# Patient Record
Sex: MALE | Race: WHITE | NOT HISPANIC OR LATINO | Employment: FULL TIME | URBAN - NONMETROPOLITAN AREA
[De-identification: names, ages, dates, MRNs, and addresses within clinical notes are randomized per-mention and may not be internally consistent; named-entity substitution may affect disease eponyms.]

---

## 2018-12-05 ENCOUNTER — HOSPITAL ENCOUNTER (EMERGENCY)
Facility: HOSPITAL | Age: 23
Discharge: HOME | End: 2018-12-05
Attending: EMERGENCY MEDICINE | Admitting: EMERGENCY MEDICINE

## 2018-12-05 ENCOUNTER — APPOINTMENT (OUTPATIENT)
Dept: GENERAL RADIOLOGY | Facility: HOSPITAL | Age: 23
End: 2018-12-05

## 2018-12-05 VITALS
BODY MASS INDEX: 23.86 KG/M2 | HEIGHT: 73 IN | OXYGEN SATURATION: 99 % | WEIGHT: 180 LBS | DIASTOLIC BLOOD PRESSURE: 50 MMHG | RESPIRATION RATE: 16 BRPM | SYSTOLIC BLOOD PRESSURE: 128 MMHG | HEART RATE: 75 BPM | TEMPERATURE: 98.3 F

## 2018-12-05 DIAGNOSIS — S97.82XA CRUSHING INJURY OF LEFT FOOT, INITIAL ENCOUNTER: Primary | ICD-10-CM

## 2018-12-05 DIAGNOSIS — S93.402A MODERATE LEFT ANKLE SPRAIN, INITIAL ENCOUNTER: ICD-10-CM

## 2018-12-05 PROCEDURE — 73610 X-RAY EXAM OF ANKLE: CPT

## 2018-12-05 PROCEDURE — 73620 X-RAY EXAM OF FOOT: CPT | Performed by: RADIOLOGY

## 2018-12-05 PROCEDURE — 73620 X-RAY EXAM OF FOOT: CPT

## 2018-12-05 PROCEDURE — 99283 EMERGENCY DEPT VISIT LOW MDM: CPT

## 2018-12-05 PROCEDURE — 73610 X-RAY EXAM OF ANKLE: CPT | Performed by: RADIOLOGY

## 2018-12-05 RX ORDER — INDOMETHACIN 25 MG/1
25 CAPSULE ORAL 3 TIMES DAILY PRN
Qty: 15 CAPSULE | Refills: 0 | OUTPATIENT
Start: 2018-12-05 | End: 2019-03-12

## 2018-12-05 NOTE — ED PROVIDER NOTES
"Subjective   This is a 24 y/o male that comes \"left foot/ankle injury\" X 2 hours. Patient states that that he dropped a trailer on left foot on left foot. Has had increased pain and swelling.  States he has not been able to bear weight on it.        History provided by:  Patient   used: No    Lower Extremity Issue   Location:  Leg and ankle  Time since incident:  1 day  Injury: yes    Mechanism of injury comment:  Direct blow  Leg location:  L leg  Ankle location:  L ankle  Pain details:     Quality:  Aching, shooting and throbbing    Radiates to:  Does not radiate    Severity:  Moderate    Onset quality:  Sudden    Duration:  2 days    Timing:  Intermittent    Progression:  Worsening  Chronicity:  New  Dislocation: no    Foreign body present:  No foreign bodies  Tetanus status:  Unknown  Prior injury to area:  No  Relieved by:  Nothing  Worsened by:  Nothing  Ineffective treatments:  None tried  Associated symptoms: decreased ROM, stiffness and swelling    Associated symptoms: no back pain    Risk factors: no concern for non-accidental trauma, no frequent fractures, no obesity and no recent illness        Review of Systems   Musculoskeletal: Positive for arthralgias, joint swelling, myalgias and stiffness. Negative for back pain.   All other systems reviewed and are negative.      No past medical history on file.    No Known Allergies    No past surgical history on file.    No family history on file.    Social History     Socioeconomic History   • Marital status: Single     Spouse name: Not on file   • Number of children: Not on file   • Years of education: Not on file   • Highest education level: Not on file           Objective   Physical Exam   Constitutional: He is oriented to person, place, and time. He appears well-developed and well-nourished. No distress.   HENT:   Head: Normocephalic.   Right Ear: External ear normal.   Left Ear: External ear normal.   Nose: Nose normal.   Mouth/Throat: " Oropharynx is clear and moist. No oropharyngeal exudate.   Eyes: Conjunctivae are normal. Pupils are equal, round, and reactive to light. Right eye exhibits no discharge. Left eye exhibits no discharge.   Neck: Normal range of motion. Neck supple. No JVD present. No tracheal deviation present. No thyromegaly present.   Cardiovascular: Normal rate, regular rhythm, normal heart sounds and intact distal pulses. Exam reveals no friction rub.   No murmur heard.  Pulmonary/Chest: Effort normal and breath sounds normal. No stridor. No respiratory distress. He has no wheezes. He has no rales.   Abdominal: Soft. Bowel sounds are normal. He exhibits no distension and no mass. There is no tenderness. There is no rebound and no guarding.   Musculoskeletal: He exhibits edema and tenderness.        Left ankle: He exhibits decreased range of motion and swelling. Tenderness. Lateral malleolus tenderness found.   Lymphadenopathy:     He has no cervical adenopathy.   Neurological: He is alert and oriented to person, place, and time. He displays normal reflexes. No cranial nerve deficit or sensory deficit. He exhibits normal muscle tone. Coordination normal.   Skin: Skin is warm and dry. Capillary refill takes less than 2 seconds. No rash noted. He is not diaphoretic. No erythema. No pallor.   Psychiatric: He has a normal mood and affect. His behavior is normal. Judgment and thought content normal.   Nursing note and vitals reviewed.      Splint - Cast - Strapping  Date/Time: 12/5/2018 4:14 PM  Performed by: Walker Kearns PA-C  Authorized by: Joe Salas MD     Consent:     Consent obtained:  Verbal    Consent given by:  Patient    Risks discussed:  Discoloration  Pre-procedure details:     Sensation:  Normal    Skin color:  Pink   Procedure details:     Laterality:  Left    Location:  Foot    Strapping: no      Cast type:  Short leg    Splint type:  Short leg    Supplies:  Prefabricated splint  Post-procedure  details:     Pain:  Improved    Sensation:  Normal    Skin color:  Pink     Patient tolerance of procedure:  Tolerated well, no immediate complications  Comments:      N/V intact                ED Course  ED Course as of Dec 05 1617   Wed Dec 05, 2018   1555 No acute bony abnormality.   []      ED Course User Index  [] Walker Kearns PA-C                  [facility]      Final diagnoses:   Crushing injury of left foot, initial encounter   Moderate left ankle sprain, initial encounter            Walker Kearns PA-C  12/05/18 1617       Walker Kearns PA-C  12/05/18 1617

## 2019-03-12 ENCOUNTER — APPOINTMENT (OUTPATIENT)
Dept: CT IMAGING | Facility: HOSPITAL | Age: 24
End: 2019-03-12

## 2019-03-12 ENCOUNTER — APPOINTMENT (OUTPATIENT)
Dept: GENERAL RADIOLOGY | Facility: HOSPITAL | Age: 24
End: 2019-03-12

## 2019-03-12 ENCOUNTER — HOSPITAL ENCOUNTER (EMERGENCY)
Facility: HOSPITAL | Age: 24
Discharge: HOME | End: 2019-03-12
Attending: EMERGENCY MEDICINE | Admitting: EMERGENCY MEDICINE

## 2019-03-12 VITALS
HEIGHT: 73 IN | BODY MASS INDEX: 23.86 KG/M2 | WEIGHT: 180 LBS | HEART RATE: 61 BPM | RESPIRATION RATE: 18 BRPM | OXYGEN SATURATION: 100 % | SYSTOLIC BLOOD PRESSURE: 111 MMHG | DIASTOLIC BLOOD PRESSURE: 73 MMHG | TEMPERATURE: 98 F

## 2019-03-12 DIAGNOSIS — S76.012A HIP STRAIN, LEFT, INITIAL ENCOUNTER: ICD-10-CM
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
## 2019-03-12 DIAGNOSIS — S39.012A STRAIN OF LUMBAR REGION, INITIAL ENCOUNTER: ICD-10-CM

## 2019-03-12 LAB
ALBUMIN SERPL-MCNC: 4.86 G/DL (ref 3.5–5.2)
ALBUMIN/GLOB SERPL: 2 G/DL
ALP SERPL-CCNC: 79 U/L (ref 39–117)
ALT SERPL W P-5'-P-CCNC: 19 U/L (ref 1–41)
AMYLASE SERPL-CCNC: 54 U/L (ref 28–100)
ANION GAP SERPL CALCULATED.3IONS-SCNC: 9.6 MMOL/L
AST SERPL-CCNC: 20 U/L (ref 1–40)
BASOPHILS # BLD AUTO: 0.02 10*3/MM3 (ref 0–0.2)
BASOPHILS NFR BLD AUTO: 0.3 % (ref 0–1.5)
BILIRUB SERPL-MCNC: 0.6 MG/DL (ref 0.1–1.2)
BILIRUB UR QL STRIP: NEGATIVE
BUN BLD-MCNC: 10 MG/DL (ref 6–20)
BUN/CREAT SERPL: 13.5 (ref 7–25)
CALCIUM SPEC-SCNC: 9.6 MG/DL (ref 8.6–10.5)
CHLORIDE SERPL-SCNC: 104 MMOL/L (ref 98–107)
CLARITY UR: CLEAR
CO2 SERPL-SCNC: 26.4 MMOL/L (ref 22–29)
COLOR UR: YELLOW
CREAT BLD-MCNC: 0.74 MG/DL (ref 0.76–1.27)
DEPRECATED RDW RBC AUTO: 39.3 FL (ref 37–54)
EOSINOPHIL # BLD AUTO: 0.22 10*3/MM3 (ref 0–0.4)
EOSINOPHIL NFR BLD AUTO: 3.4 % (ref 0.3–6.2)
ERYTHROCYTE [DISTWIDTH] IN BLOOD BY AUTOMATED COUNT: 13.1 % (ref 12.3–15.4)
GFR SERPL CREATININE-BSD FRML MDRD: 131 ML/MIN/1.73
GLOBULIN UR ELPH-MCNC: 2.4 GM/DL
GLUCOSE BLD-MCNC: 88 MG/DL (ref 65–99)
GLUCOSE UR STRIP-MCNC: NEGATIVE MG/DL
HCT VFR BLD AUTO: 43.8 % (ref 37.5–51)
HGB BLD-MCNC: 14.8 G/DL (ref 13–17.7)
HGB UR QL STRIP.AUTO: NEGATIVE
IMM GRANULOCYTES # BLD AUTO: 0.01 10*3/MM3 (ref 0–0.05)
IMM GRANULOCYTES NFR BLD AUTO: 0.2 % (ref 0–0.5)
KETONES UR QL STRIP: NEGATIVE
LEUKOCYTE ESTERASE UR QL STRIP.AUTO: NEGATIVE
LIPASE SERPL-CCNC: 20 U/L (ref 13–60)
LYMPHOCYTES # BLD AUTO: 1.94 10*3/MM3 (ref 0.7–3.1)
LYMPHOCYTES NFR BLD AUTO: 29.8 % (ref 19.6–45.3)
MCH RBC QN AUTO: 28 PG (ref 26.6–33)
MCHC RBC AUTO-ENTMCNC: 33.8 G/DL (ref 31.5–35.7)
MCV RBC AUTO: 82.8 FL (ref 79–97)
MONOCYTES # BLD AUTO: 0.48 10*3/MM3 (ref 0.1–0.9)
MONOCYTES NFR BLD AUTO: 7.4 % (ref 5–12)
NEUTROPHILS # BLD AUTO: 3.84 10*3/MM3 (ref 1.4–7)
NEUTROPHILS NFR BLD AUTO: 58.9 % (ref 42.7–76)
NITRITE UR QL STRIP: NEGATIVE
PH UR STRIP.AUTO: 8.5 [PH] (ref 5–8)
PLATELET # BLD AUTO: 249 10*3/MM3 (ref 140–450)
PMV BLD AUTO: 9.6 FL (ref 6–12)
POTASSIUM BLD-SCNC: 4.4 MMOL/L (ref 3.5–5.2)
PROT SERPL-MCNC: 7.3 G/DL (ref 6–8.5)
PROT UR QL STRIP: NEGATIVE
RBC # BLD AUTO: 5.29 10*6/MM3 (ref 4.14–5.8)
SODIUM BLD-SCNC: 140 MMOL/L (ref 136–145)
SP GR UR STRIP: 1.02 (ref 1–1.03)
UROBILINOGEN UR QL STRIP: ABNORMAL
WBC NRBC COR # BLD: 6.51 10*3/MM3 (ref 3.4–10.8)

## 2019-03-12 PROCEDURE — 85025 COMPLETE CBC W/AUTO DIFF WBC: CPT | Performed by: PHYSICIAN ASSISTANT

## 2019-03-12 PROCEDURE — 96360 HYDRATION IV INFUSION INIT: CPT

## 2019-03-12 PROCEDURE — 81003 URINALYSIS AUTO W/O SCOPE: CPT | Performed by: PHYSICIAN ASSISTANT

## 2019-03-12 PROCEDURE — 25010000002 IOPAMIDOL 61 % SOLUTION: Performed by: EMERGENCY MEDICINE

## 2019-03-12 PROCEDURE — 99284 EMERGENCY DEPT VISIT MOD MDM: CPT

## 2019-03-12 PROCEDURE — 73502 X-RAY EXAM HIP UNI 2-3 VIEWS: CPT

## 2019-03-12 PROCEDURE — 83690 ASSAY OF LIPASE: CPT | Performed by: PHYSICIAN ASSISTANT

## 2019-03-12 PROCEDURE — 73502 X-RAY EXAM HIP UNI 2-3 VIEWS: CPT | Performed by: RADIOLOGY

## 2019-03-12 PROCEDURE — 72125 CT NECK SPINE W/O DYE: CPT | Performed by: RADIOLOGY

## 2019-03-12 PROCEDURE — 72131 CT LUMBAR SPINE W/O DYE: CPT

## 2019-03-12 PROCEDURE — 74177 CT ABD & PELVIS W/CONTRAST: CPT | Performed by: RADIOLOGY

## 2019-03-12 PROCEDURE — 70450 CT HEAD/BRAIN W/O DYE: CPT

## 2019-03-12 PROCEDURE — 96361 HYDRATE IV INFUSION ADD-ON: CPT

## 2019-03-12 PROCEDURE — 73610 X-RAY EXAM OF ANKLE: CPT

## 2019-03-12 PROCEDURE — 82150 ASSAY OF AMYLASE: CPT | Performed by: PHYSICIAN ASSISTANT

## 2019-03-12 PROCEDURE — 71045 X-RAY EXAM CHEST 1 VIEW: CPT | Performed by: RADIOLOGY

## 2019-03-12 PROCEDURE — 71045 X-RAY EXAM CHEST 1 VIEW: CPT

## 2019-03-12 PROCEDURE — 72125 CT NECK SPINE W/O DYE: CPT

## 2019-03-12 PROCEDURE — 72131 CT LUMBAR SPINE W/O DYE: CPT | Performed by: RADIOLOGY

## 2019-03-12 PROCEDURE — 70450 CT HEAD/BRAIN W/O DYE: CPT | Performed by: RADIOLOGY

## 2019-03-12 PROCEDURE — 73610 X-RAY EXAM OF ANKLE: CPT | Performed by: RADIOLOGY

## 2019-03-12 PROCEDURE — 80053 COMPREHEN METABOLIC PANEL: CPT | Performed by: PHYSICIAN ASSISTANT

## 2019-03-12 PROCEDURE — 74177 CT ABD & PELVIS W/CONTRAST: CPT

## 2019-03-12 RX ORDER — SODIUM CHLORIDE 0.9 % (FLUSH) 0.9 %
10 SYRINGE (ML) INJECTION AS NEEDED
Status: DISCONTINUED | OUTPATIENT
Start: 2019-03-12 | End: 2019-03-12 | Stop reason: HOSPADM

## 2019-03-12 RX ORDER — CYCLOBENZAPRINE HCL 10 MG
10 TABLET ORAL 3 TIMES DAILY PRN
Qty: 15 TABLET | Refills: 0 | Status: SHIPPED | OUTPATIENT
Start: 2019-03-12

## 2019-03-12 RX ORDER — SODIUM CHLORIDE 9 MG/ML
125 INJECTION, SOLUTION INTRAVENOUS CONTINUOUS
Status: DISCONTINUED | OUTPATIENT
Start: 2019-03-12 | End: 2019-03-12 | Stop reason: HOSPADM

## 2019-03-12 RX ORDER — NAPROXEN 500 MG/1
500 TABLET ORAL 2 TIMES DAILY PRN
Qty: 20 TABLET | Refills: 0 | Status: SHIPPED | OUTPATIENT
Start: 2019-03-12

## 2019-03-12 RX ADMIN — SODIUM CHLORIDE 125 ML/HR: 9 INJECTION, SOLUTION INTRAVENOUS at 11:50

## 2019-03-12 RX ADMIN — IOPAMIDOL 100 ML: 612 INJECTION, SOLUTION INTRAVENOUS at 14:14
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
## 2019-03-20 ENCOUNTER — TRANSCRIBE ORDERS (OUTPATIENT)
Dept: PHYSICAL THERAPY | Facility: HOSPITAL | Age: 24
End: 2019-03-20

## 2019-03-20 DIAGNOSIS — S39.012D STRAIN OF LUMBAR REGION, SUBSEQUENT ENCOUNTER: Primary | ICD-10-CM

## 2019-03-20 DIAGNOSIS — S86.912D STRAIN OF LEFT KNEE, SUBSEQUENT ENCOUNTER: ICD-10-CM

## 2019-03-21 ENCOUNTER — HOSPITAL ENCOUNTER (OUTPATIENT)
Dept: PHYSICAL THERAPY | Facility: HOSPITAL | Age: 24
Setting detail: THERAPIES SERIES
Discharge: HOME | End: 2019-03-21

## 2019-03-21 DIAGNOSIS — M25.551 BILATERAL HIP PAIN: ICD-10-CM

## 2019-03-21 DIAGNOSIS — M25.562 ACUTE PAIN OF LEFT KNEE: ICD-10-CM

## 2019-03-21 DIAGNOSIS — S39.012D STRAIN OF LUMBAR REGION, SUBSEQUENT ENCOUNTER: Primary | ICD-10-CM

## 2019-03-21 DIAGNOSIS — M25.552 BILATERAL HIP PAIN: ICD-10-CM

## 2019-03-21 PROCEDURE — G0283 ELEC STIM OTHER THAN WOUND: HCPCS | Performed by: PHYSICAL THERAPIST

## 2019-03-21 PROCEDURE — 97162 PT EVAL MOD COMPLEX 30 MIN: CPT | Performed by: PHYSICAL THERAPIST

## 2019-03-21 PROCEDURE — 97010 HOT OR COLD PACKS THERAPY: CPT | Performed by: PHYSICAL THERAPIST
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
## 2019-03-25 ENCOUNTER — HOSPITAL ENCOUNTER (OUTPATIENT)
Dept: PHYSICAL THERAPY | Facility: HOSPITAL | Age: 24
Setting detail: THERAPIES SERIES
Discharge: HOME | End: 2019-03-25

## 2019-03-25 DIAGNOSIS — S39.012D STRAIN OF LUMBAR REGION, SUBSEQUENT ENCOUNTER: Primary | ICD-10-CM

## 2019-03-25 DIAGNOSIS — M25.552 BILATERAL HIP PAIN: ICD-10-CM

## 2019-03-25 DIAGNOSIS — M25.551 BILATERAL HIP PAIN: ICD-10-CM

## 2019-03-25 DIAGNOSIS — M25.562 ACUTE PAIN OF LEFT KNEE: ICD-10-CM

## 2019-03-25 PROCEDURE — 97110 THERAPEUTIC EXERCISES: CPT | Performed by: PHYSICAL THERAPIST

## 2019-03-25 PROCEDURE — 97035 APP MDLTY 1+ULTRASOUND EA 15: CPT | Performed by: PHYSICAL THERAPIST

## 2019-03-25 PROCEDURE — 97010 HOT OR COLD PACKS THERAPY: CPT | Performed by: PHYSICAL THERAPIST

## 2019-03-25 PROCEDURE — G0283 ELEC STIM OTHER THAN WOUND: HCPCS | Performed by: PHYSICAL THERAPIST

## 2019-03-25 NOTE — THERAPY TREATMENT NOTE
Outpatient Physical Therapy Ortho Treatment Note   [place]     Patient Name: Gabi Hinds  : 1995  MRN: [id number]  Today's Date: 3/25/2019      Visit Date: 2019    Visit Dx:    ICD-10-CM ICD-9-CM   1. Strain of lumbar region, subsequent encounter S39.012D V58.89     847.2   2. Acute pain of left knee M25.562 719.46   3. Bilateral hip pain M25.551 719.45    M25.552        There is no problem list on file for this patient.       No past medical history on file.     Past Surgical History:   Procedure Laterality Date   • FRACTURE SURGERY         PT Ortho     Row Name 19 1000       Subjective Comments    Subjective Comments  Pt reported 5/10 pain prior to today's treatment session.  -AD       Subjective Pain    Able to rate subjective pain?  yes  -AD    Pre-Treatment Pain Level  5  -AD    Post-Treatment Pain Level  0  -AD      User Key  (r) = Recorded By, (t) = Taken By, (c) = Cosigned By    Initials Name Provider Type    AD Dalton, Ashley Claudene, PT Physical Therapist                      PT Assessment/Plan     Row Name 19 1004          PT Assessment    Assessment Comments  Today's session was initiated with moist heat combined with IFC to the lumbar region in the seated position. Therapeutic exercises were performed in the supine position to address lumbar ROM, strength, and postural control. Tactile and verbal cues were required for proper form. The session concluded with therapeutic ultrasound to bilateral lumbar paraspinals in the left sidelying position. No skin irritation was observed following modalities. The patient reported 0/10 pain following the session. He will be progressed as tolerated.  -AD        PT Plan    PT Plan Comments  Progress as tolerated per POC.  -AD       User Key  (r) = Recorded By, (t) = Taken By, (c) = Cosigned By    Initials Name Provider Type    AD Dalton, Ashley Claudene, PT Physical Therapist          Modalities     Row Name 19 5566              Moist Heat    MH Applied  Yes With IFC, no skin irritation observed.  -AD      Location  Lumbar  -AD      Rx Minutes  10 mins  -AD      MH S/P Rx  Yes  -AD         Ultrasound 37547    Location  Lumbar paraspinals In left sidelying, no skin irritation observed.  -AD      Duty Cycle  100  -AD      Frequency  1.0 MHz  -AD      Intensity - Wts/cm  1.2  -AD      95592 - PT Ultrasound Minutes  8  -AD         ELECTRICAL STIMULATION    Attended/Unattended  Unattended With MH, no skin irritation observed.  -AD      Stimulation Type  IFC  -AD      Max mAmp  -- per patient's tolerance  -AD      Location/Electrode Placement/Other  Lumbar  -AD       PT E-Stim Unattended (Manual) Minutes  10  -AD        User Key  (r) = Recorded By, (t) = Taken By, (c) = Cosigned By    Initials Name Provider Type    AD Dalton, Ashley Claudene, JAYLA Physical Therapist        Exercises     Row Name 03/25/19 1000             Subjective Comments    Subjective Comments  Pt reported 5/10 pain prior to today's treatment session.  -AD         Subjective Pain    Able to rate subjective pain?  yes  -AD      Pre-Treatment Pain Level  5  -AD      Post-Treatment Pain Level  0  -AD         Total Minutes    06482 - PT Therapeutic Exercise Minutes  32  -AD         Exercise 1    Exercise Name 1  LTR, SKTC, piriformis stretch, supine march, supikne clams, QS, SLR, SAQ, bridges.  -AD      Cueing 1  Verbal;Tactile;Demo  -AD      Time 1  32 minutes  -AD        User Key  (r) = Recorded By, (t) = Taken By, (c) = Cosigned By    Initials Name Provider Type    AD Dalton, Ashley Claudene, JAYLA Physical Therapist                           Therapy Education  Given: HEP, Symptoms/condition management, Pain management, Posture/body mechanics  Program: Reinforced  How Provided: Verbal, Demonstration  Provided to: Patient  Level of Understanding: Teach back education performed, Verbalized, Demonstrated              Time Calculation:   Start Time: 0730  Stop Time: 0825  Time  Calculation (min): 55 min  Therapy Charges for Today     Code Description Service Date Service Provider Modifiers Qty    61206005352 HC PT THER PROC EA 15 MIN 3/25/2019 Dalton, Ashley Claudene, PT GP 2    86104706581 HC PT ELECTRICAL STIM UNATTENDED 3/25/2019 Dalton, Ashley Claudene, PT  1    35498053582 HC PT HOT/COLD PACK WC NONMCARE 3/25/2019 Dalton, Ashley Claudene, PT GP 1    32575762495 HC PT ULTRASOUND EA 15 MIN 3/25/2019 Dalton, Ashley Claudene, PT GP 1                    Ashley Claudene Dalton, PT  3/25/2019

## 2019-03-27 ENCOUNTER — HOSPITAL ENCOUNTER (OUTPATIENT)
Dept: PHYSICAL THERAPY | Facility: HOSPITAL | Age: 24
Setting detail: THERAPIES SERIES
Discharge: HOME | End: 2019-03-27

## 2019-03-27 DIAGNOSIS — M25.551 BILATERAL HIP PAIN: ICD-10-CM

## 2019-03-27 DIAGNOSIS — M25.562 ACUTE PAIN OF LEFT KNEE: ICD-10-CM

## 2019-03-27 DIAGNOSIS — S39.012D STRAIN OF LUMBAR REGION, SUBSEQUENT ENCOUNTER: Primary | ICD-10-CM

## 2019-03-27 DIAGNOSIS — M25.552 BILATERAL HIP PAIN: ICD-10-CM

## 2019-03-27 PROCEDURE — 97035 APP MDLTY 1+ULTRASOUND EA 15: CPT

## 2019-03-27 PROCEDURE — 97110 THERAPEUTIC EXERCISES: CPT

## 2019-03-27 PROCEDURE — 97010 HOT OR COLD PACKS THERAPY: CPT

## 2019-03-27 PROCEDURE — G0283 ELEC STIM OTHER THAN WOUND: HCPCS

## 2019-03-27 NOTE — THERAPY TREATMENT NOTE
Outpatient Physical Therapy Ortho Treatment Note   [place]     Patient Name: Gabi Hinds  : 1995  MRN: [id number]  Today's Date: 3/27/2019      Visit Date: 2019    Visit Dx:    ICD-10-CM ICD-9-CM   1. Strain of lumbar region, subsequent encounter S39.012D V58.89     847.2   2. Acute pain of left knee M25.562 719.46   3. Bilateral hip pain M25.551 719.45    M25.552        There is no problem list on file for this patient.       No past medical history on file.     Past Surgical History:   Procedure Laterality Date   • FRACTURE SURGERY         PT Ortho     Row Name 19 0800       Subjective Comments    Subjective Comments  Patient states he has been doing his stretches at home, and is feeling some better.  Pt reports 1/10 pain pre and post tx.   -MARCO ANTONIO       Subjective Pain    Able to rate subjective pain?  yes  -MARCO ANTONIO    Pre-Treatment Pain Level  1  -MARCO ANTONIO    Post-Treatment Pain Level  1  -MARCO ANTONIO    Row Name 19 1000       Subjective Comments    Subjective Comments  Pt reported 5/10 pain prior to today's treatment session.  -AD       Subjective Pain    Able to rate subjective pain?  yes  -AD    Pre-Treatment Pain Level  5  -AD    Post-Treatment Pain Level  0  -AD      User Key  (r) = Recorded By, (t) = Taken By, (c) = Cosigned By    Initials Name Provider Type    AD Dalton, Ashley Claudene, PT Physical Therapist    Eilzabeth Carmona, PTA Physical Therapy Assistant                      PT Assessment/Plan     Row Name 19 0851          PT Assessment    Assessment Comments  Patient tolerated treatment well today w/ no changes in pain noted pre and post session.  MH and Estim applied to low back in seated position for pain control f/b therex as listed w/ focus on improved range of motion to involved areas, core/ lumbar stability, and LE stregthening as tolerated.  Treatment concluded with continuous US.  Therex progressed w/ addition of LE bike, step ups and TG; pt noted w/ good  "tolerance.  Pt continues to progress as tolerated.  No adverse reactions observed following modalities.   -MARCO ANTONIO        PT Plan    PT Plan Comments  Continue with PT's POC and progress tx as tolerated by patient.   -MARCO ANTONIO       User Key  (r) = Recorded By, (t) = Taken By, (c) = Cosigned By    Initials Name Provider Type    Elizabeth Carmona PTA Physical Therapy Assistant          Modalities     Row Name 03/27/19 0800             Moist Heat    MH Applied  Yes no redness observed following MH  -MARCO ANTONIO      Location  Lumbar  -MARCO ANTONIO      Rx Minutes  10 mins  -MARCO ANTONIO      MH Prior to Rx  Yes w/ estim in seated position  -MARCO ANTONIO  Applied by Nevaeh Sullivan, PT DPT         Ultrasound 87901    Location  Lumbar paraspinals pt L) sidelying w/ pillow between knees  -MARCO ANTONIO      Duty Cycle  100  -MARCO ANTONIO      Frequency  1.0 MHz  -MARCO ANTONIO      Intensity - Wts/cm  1.2  -MARCO ANTONIO      61065 - PT Ultrasound Minutes  8 no skin irritation observed following   -MARCO ANTONIO         ELECTRICAL STIMULATION    Attended/Unattended  Unattended no skin irritation observed following  -MARCO ANTONIO      Stimulation Type  IFC  -MARCO ANTONIO      Location/Electrode Placement/Other  Lumbar  -MARCO ANTONIO       PT E-Stim Unattended (Manual) Minutes  10  -MARCO ANTONIO  Applied by Nevaeh Sullivan, PT DPT        User Key  (r) = Recorded By, (t) = Taken By, (c) = Cosigned By    Initials Name Provider Type    Elizabeth Carmona PTA Physical Therapy Assistant        Exercises     Row Name 03/27/19 0800             Subjective Comments    Subjective Comments  Patient states he has been doing his stretches at home, and is feeling some better.  Pt reports 1/10 pain pre and post tx.   -MARCO ANTONIO         Subjective Pain    Able to rate subjective pain?  yes  -MARCO ANTONIO      Pre-Treatment Pain Level  1  -MARCO ANTONIO      Post-Treatment Pain Level  1  -MARCO ANTONIO         Total Minutes    60491 - PT Therapeutic Exercise Minutes  35  -MARCO ANTONIO         Exercise 1    Exercise Name 1  LTR 15x2, SKTC 3x20\", piriformis stretch 3x20\", PPT 10x2, sidelying clams 10x2, LE bike " "LV 2 x 5min, TG squats LV 16 10x2, LAQ 10x2, step ups 6\" 10x2  -MARCO ANTONIO      Cueing 1  Verbal;Tactile;Demo  -MARCO ANTONIO      Time 1  35 min  -MARCO ANTONIO        User Key  (r) = Recorded By, (t) = Taken By, (c) = Cosigned By    Initials Name Provider Type    Elizabeth Carmona PTA Physical Therapy Assistant                           Therapy Education  Given: HEP, Symptoms/condition management, Pain management, Posture/body mechanics  Program: Reinforced  How Provided: Verbal, Demonstration  Provided to: Patient, Caregiver  Level of Understanding: Teach back education performed, Verbalized, Demonstrated              Time Calculation:   Start Time: 0730  Stop Time: 0830  Time Calculation (min): 60 min  Therapy Charges for Today     Code Description Service Date Service Provider Modifiers Qty    25227854838 HC PT THER PROC EA 15 MIN 3/27/2019 Elizabeth Matthew PTA GP 2    90014035322 HC PT ULTRASOUND EA 15 MIN 3/27/2019 Elizabeth Matthew PTA GP 1    95768792876 HC PT ELECTRICAL STIM UNATTENDED 3/27/2019 Elizabeth Matthew PTA  1    39647476589 HC PT HOT/COLD PACK WC NONMCARE 3/27/2019 Elizabeth Matthew PTA GP 1                    Elizabeth Perez. ALONDRA Matthew  3/27/2019     "

## 2019-03-29 ENCOUNTER — HOSPITAL ENCOUNTER (OUTPATIENT)
Dept: PHYSICAL THERAPY | Facility: HOSPITAL | Age: 24
Setting detail: THERAPIES SERIES
Discharge: HOME | End: 2019-03-29

## 2019-03-29 DIAGNOSIS — M25.551 BILATERAL HIP PAIN: ICD-10-CM

## 2019-03-29 DIAGNOSIS — S39.012D STRAIN OF LUMBAR REGION, SUBSEQUENT ENCOUNTER: Primary | ICD-10-CM

## 2019-03-29 DIAGNOSIS — M25.562 ACUTE PAIN OF LEFT KNEE: ICD-10-CM

## 2019-03-29 DIAGNOSIS — M25.552 BILATERAL HIP PAIN: ICD-10-CM

## 2019-03-29 PROCEDURE — G0283 ELEC STIM OTHER THAN WOUND: HCPCS | Performed by: PHYSICAL THERAPIST

## 2019-03-29 PROCEDURE — 97010 HOT OR COLD PACKS THERAPY: CPT | Performed by: PHYSICAL THERAPIST

## 2019-03-29 PROCEDURE — 97110 THERAPEUTIC EXERCISES: CPT | Performed by: PHYSICAL THERAPIST

## 2019-03-29 NOTE — THERAPY TREATMENT NOTE
Outpatient Physical Therapy Ortho Treatment Note   [place]     Patient Name: Gabi Hinds  : 1995  MRN: [id number]  Today's Date: 3/29/2019      Visit Date: 2019    Visit Dx:    ICD-10-CM ICD-9-CM   1. Strain of lumbar region, subsequent encounter S39.012D V58.89     847.2   2. Acute pain of left knee M25.562 719.46   3. Bilateral hip pain M25.551 719.45    M25.552        There is no problem list on file for this patient.       No past medical history on file.     Past Surgical History:   Procedure Laterality Date   • FRACTURE SURGERY         PT Ortho     Row Name 19 1000       Subjective Comments    Subjective Comments  Patient reports that his legs are sore today.  -BE       Subjective Pain    Able to rate subjective pain?  yes  -BE    Pre-Treatment Pain Level  6  -BE    Post-Treatment Pain Level  2  -BE    Row Name 19 0800       Subjective Comments    Subjective Comments  Patient states he has been doing his stretches at home, and is feeling some better.  Pt reports 1/10 pain pre and post tx.   -MARCO ANTONIO       Subjective Pain    Able to rate subjective pain?  yes  -MARCO ANTONIO    Pre-Treatment Pain Level  1  -MARCO ANTONIO    Post-Treatment Pain Level  1  -MARCO ANTONIO      User Key  (r) = Recorded By, (t) = Taken By, (c) = Cosigned By    Initials Name Provider Type    Elizabeth Carmona, PTA Physical Therapy Assistant    BE Saadia Monae, PT Physical Therapist                      PT Assessment/Plan     Row Name 19 1026          PT Assessment    Assessment Comments  Patient tolerated therapy session well, with reports of decreased pain following therapy.  He noted 6/10 pain pre-tx and 2/10 pain post-tx.  Therapy session consisted of MH, ESTIM, ther ex, and continuous US.  No adverse reactions were noted with modalities.  Ther ex progressed to include increased repetitions and the addition of mini squats.  Patient will continue to be progressed per his tolerance and POC.  -BE        PT Plan     "PT Plan Comments  Progress per patient's tolerance and POC.  -BE       User Key  (r) = Recorded By, (t) = Taken By, (c) = Cosigned By    Initials Name Provider Type    BE Saadia Monae PT Physical Therapist          Modalities     Row Name 03/29/19 1000             Moist Heat    MH Applied  Yes no redness following MH  -BE      Location  Lumbar  -BE      Rx Minutes  15 mins  -BE      MH Prior to Rx  Yes w/ estim in seated position  -BE         Ultrasound 72560    Location  Lumbar paraspinals no skin irritation following US  -BE      Duty Cycle  100  -BE      Frequency  1.0 MHz  -BE      Intensity - Wts/cm  1.2  -BE      64282 - PT Ultrasound Minutes  8  -BE         ELECTRICAL STIMULATION    Attended/Unattended  Unattended no skin irritation observed following  -BE      Stimulation Type  IFC  -BE      Location/Electrode Placement/Other  Lumbar  -BE       PT E-Stim Unattended (Manual) Minutes  15  -BE        User Key  (r) = Recorded By, (t) = Taken By, (c) = Cosigned By    Initials Name Provider Type    BE Saadia Monae PT Physical Therapist        Exercises     Row Name 03/29/19 1000             Subjective Comments    Subjective Comments  Patient reports that his legs are sore today.  -BE         Subjective Pain    Able to rate subjective pain?  yes  -BE      Pre-Treatment Pain Level  6  -BE      Post-Treatment Pain Level  2  -BE         Total Minutes    47383 - PT Therapeutic Exercise Minutes  40  -BE         Exercise 1    Exercise Name 1  LTR 3x10, SKTC 3x20\", Piriformis stretch 3x20\", PPT 3x10, Bridges 2x10, QS 3x10, SLR 2x10, SAQ 2x15, Supine march 2x15, Supine clams 2x15, LAQ 2x10, Step ups 2x10, Mini squts 15x  -BE      Cueing 1  Verbal;Tactile;Demo  -BE      Time 1  40 min  -BE        User Key  (r) = Recorded By, (t) = Taken By, (c) = Cosigned By    Initials Name Provider Type    BE Saadia Monae PT Physical Therapist                           Therapy Education  Given: HEP, " Symptoms/condition management, Pain management, Posture/body mechanics  Program: Reinforced  How Provided: Verbal, Demonstration  Provided to: Patient, Caregiver  Level of Understanding: Teach back education performed, Verbalized, Demonstrated              Time Calculation:   Start Time: 0755  Stop Time: 0900  Time Calculation (min): 65 min  Therapy Charges for Today     Code Description Service Date Service Provider Modifiers Qty    36482130573 HC PT THER PROC EA 15 MIN 3/29/2019 Saadia Monae, PT GP 3    91603271465 HC PT ELECTRICAL STIM UNATTENDED 3/29/2019 Saadia Monae, PT  1    [id number]  PT HOT/COLD PACK WC NONMCARE 3/29/2019 Saadia Monae, PT GP 1                    Saadia Monae, PT  3/29/2019

## 2019-04-03 ENCOUNTER — HOSPITAL ENCOUNTER (OUTPATIENT)
Dept: PHYSICAL THERAPY | Facility: HOSPITAL | Age: 24
Setting detail: THERAPIES SERIES
Discharge: HOME | End: 2019-04-03

## 2019-04-03 DIAGNOSIS — M25.562 ACUTE PAIN OF LEFT KNEE: ICD-10-CM

## 2019-04-03 DIAGNOSIS — M25.552 BILATERAL HIP PAIN: ICD-10-CM

## 2019-04-03 DIAGNOSIS — M25.551 BILATERAL HIP PAIN: ICD-10-CM

## 2019-04-03 DIAGNOSIS — S39.012D STRAIN OF LUMBAR REGION, SUBSEQUENT ENCOUNTER: Primary | ICD-10-CM

## 2019-04-03 PROCEDURE — 97140 MANUAL THERAPY 1/> REGIONS: CPT

## 2019-04-03 PROCEDURE — G0283 ELEC STIM OTHER THAN WOUND: HCPCS

## 2019-04-03 PROCEDURE — 97010 HOT OR COLD PACKS THERAPY: CPT

## 2019-04-03 PROCEDURE — 97110 THERAPEUTIC EXERCISES: CPT

## 2019-04-03 NOTE — THERAPY TREATMENT NOTE
Outpatient Physical Therapy Ortho Treatment Note  [facility]     Patient Name: Gabi Hinds  : 1995  MRN: [id number]  Today's Date: 4/3/2019      Visit Date: 2019    Visit Dx:    ICD-10-CM ICD-9-CM   1. Strain of lumbar region, subsequent encounter S39.012D V58.89     847.2   2. Acute pain of left knee M25.562 719.46   3. Bilateral hip pain M25.551 719.45    M25.552        There is no problem list on file for this patient.       No past medical history on file.     Past Surgical History:   Procedure Laterality Date   • FRACTURE SURGERY         PT Ortho     Row Name 19 0800       Subjective Comments    Subjective Comments  Patient arrives to therapy w/ continued reports of lowback and bilateral hip pain, 6/10 pre tx.  Pt states he is scheduled to f/u with [facility] today.    -MARCO ANTONIO       Subjective Pain    Able to rate subjective pain?  yes  -MARCO ANTONIO    Pre-Treatment Pain Level  6  -MARCO ANTONIO    Post-Treatment Pain Level  4  -MARCO ANTONIO      User Key  (r) = Recorded By, (t) = Taken By, (c) = Cosigned By    Initials Name Provider Type    Elizabeth Carmona, PTA Physical Therapy Assistant                      PT Assessment/Plan     Row Name 19 0852          PT Assessment    Assessment Comments  Patient completed today's session w/ reports of decreased pain following, 4/10.  Treatment initiated w/ MH and Estim to low back, applied by Nevaeh Sullivan, PT DPT, f/b therex as listed.  Tband postural strengthening activities began w/ good tolerance noted.  Pt required verbal cues and demonstration for new added therex.  Session concluded with manual STM to assist w/ pain control and decrease muscular tightness noted in paraspinals. Pt will be progressed as tolerated.  No adverse reactions observed during tx and/ or following.    -MARCO ANTONIO        PT Plan    PT Plan Comments  Continue with PT's POC and will progress tx as tolerated by patient.   -MARCO ANTONIO       User Key  (r) = Recorded By, (t) = Taken By, (c) = Cosigned  "By    Initials Name Provider Type    Elizabeth Carmona PTA Physical Therapy Assistant          Modalities     Row Name 04/03/19 0800             Moist Heat    MH Applied  Yes no redness observed following MH  -MARCO ANTONIO      Location  Lumbar  -MARCO ANTONIO      Rx Minutes  10 mins  -MARCO ANTONIO      MH Prior to Rx  Yes w/ estim in seated position  -MARCO ANTONIO         ELECTRICAL STIMULATION    Attended/Unattended  Unattended no skin irritation observed following estim  -MARCO ANTONIO      Stimulation Type  IFC  -MARCO ANTONIO      Max mAmp  -- as to pt's tolerance  -MARCO ANTONIO      Location/Electrode Placement/Other  Lumbar  -MARCO ANTONIO       PT E-Stim Unattended (Manual) Minutes  10  -MARCO ANTONIO        User Key  (r) = Recorded By, (t) = Taken By, (c) = Cosigned By    Initials Name Provider Type    Elizabeth Carmona PTA Physical Therapy Assistant        Exercises     Row Name 04/03/19 0800 04/03/19 0700          Subjective Comments    Subjective Comments  Patient arrives to therapy w/ continued reports of lowback and bilateral hip pain, 6/10 pre tx.  Pt states he is scheduled to f/u with [facility] today.    -MARCO ANTONIO  --        Subjective Pain    Able to rate subjective pain?  yes  -MARCO ANTONIO  --     Pre-Treatment Pain Level  6  -MARCO ANTONIO  --     Post-Treatment Pain Level  4  -MARCO ANTONIO  --        Total Minutes    28048 - PT Therapeutic Exercise Minutes  30  -MARCO ANTONIO  --     94282 - PT Manual Therapy Minutes  --  10  -MARCO ANTONIO        Exercise 1    Exercise Name 1  LTR 10x3, SKTC 3x20\", piriformis stretch 3x20\", PPT 15x2, bridge 10x2, clams (sidelying) 10x2, supine march 10x2, TG squats LV 18 10x2, midrow w/ tband (red) 10x2  -MARCO ANTONIO  --     Cueing 1  Verbal;Tactile;Demo  -MARCO ANTONIO  --     Time 1  30 min  -MARCO ANTONIO  --       User Key  (r) = Recorded By, (t) = Taken By, (c) = Cosigned By    Initials Name Provider Type    Elizabeth Carmona PTA Physical Therapy Assistant                      Manual Rx (last 36 hours)      Manual Treatments     Row Name 04/03/19 0700             Total Minutes    20080 - PT Manual " Therapy Minutes  10  -MARCO ANTONIO         Manual Rx 1    Manual Rx 1 Location  lumbar paraspinals  -MARCO ANTONIO      Manual Rx 1 Type  STM  -MARCO ANTONIO      Manual Rx 1 Grade  as to pt's tolerance  -MARCO ANTONIO      Manual Rx 1 Duration  10 min  -MARCO ANTONIO        User Key  (r) = Recorded By, (t) = Taken By, (c) = Cosigned By    Initials Name Provider Type    Elizabeth Carmona PTA Physical Therapy Assistant              Therapy Education  Given: HEP, Symptoms/condition management, Pain management, Posture/body mechanics  Program: Reinforced  How Provided: Verbal, Demonstration  Provided to: Patient  Level of Understanding: Demonstrated, Verbalized              Time Calculation:   Start Time: 0745  Stop Time: 0840  Time Calculation (min): 55 min  Therapy Charges for Today     Code Description Service Date Service Provider Modifiers Qty    40832504283 HC PT THER PROC EA 15 MIN 4/3/2019 Elizabeth Matthew PTA GP 2    97141693313 HC PT MANUAL THERAPY EA 15 MIN 4/3/2019 Elizabeth Matthew PTA GP 1    46786839350 HC PT ELECTRICAL STIM UNATTENDED 4/3/2019 Elizabeth Matthew PTA  1    80951292408 HC PT HOT/COLD PACK WC NONMCARE 4/3/2019 Elizabeth Matthew PTA GP 1                    Elizabeth Perez. ALONDRA Matthew  4/3/2019

## 2019-04-05 ENCOUNTER — HOSPITAL ENCOUNTER (OUTPATIENT)
Dept: PHYSICAL THERAPY | Facility: HOSPITAL | Age: 24
Setting detail: THERAPIES SERIES
Discharge: HOME | End: 2019-04-05

## 2019-04-05 DIAGNOSIS — M25.562 ACUTE PAIN OF LEFT KNEE: ICD-10-CM

## 2019-04-05 DIAGNOSIS — M25.551 BILATERAL HIP PAIN: ICD-10-CM

## 2019-04-05 DIAGNOSIS — M25.552 BILATERAL HIP PAIN: ICD-10-CM

## 2019-04-05 DIAGNOSIS — S39.012D STRAIN OF LUMBAR REGION, SUBSEQUENT ENCOUNTER: Primary | ICD-10-CM

## 2019-04-05 PROCEDURE — G0283 ELEC STIM OTHER THAN WOUND: HCPCS

## 2019-04-05 PROCEDURE — 97110 THERAPEUTIC EXERCISES: CPT

## 2019-04-05 PROCEDURE — 97010 HOT OR COLD PACKS THERAPY: CPT

## 2019-04-05 PROCEDURE — 97140 MANUAL THERAPY 1/> REGIONS: CPT

## 2019-04-05 NOTE — THERAPY TREATMENT NOTE
Outpatient Physical Therapy Ortho Treatment Note   [place]     Patient Name: Gabi Hinds  : 1995  MRN: [id number]  Today's Date: 2019      Visit Date: 2019    Visit Dx:    ICD-10-CM ICD-9-CM   1. Strain of lumbar region, subsequent encounter S39.012D V58.89     847.2   2. Acute pain of left knee M25.562 719.46   3. Bilateral hip pain M25.551 719.45    M25.552        There is no problem list on file for this patient.       No past medical history on file.     Past Surgical History:   Procedure Laterality Date   • FRACTURE SURGERY         PT Ortho     Row Name 19 0700       Subjective Pain    Able to rate subjective pain?  yes  -MARCO ANTONIO    Pre-Treatment Pain Level  6 5-6/10  -MARCO ANTONIO    Post-Treatment Pain Level  3  -MARCO ANTONIO    Row Name 19 0800       Subjective Comments    Subjective Comments  Patient arrives to therapy w/ continued reports of lowback and bilateral hip pain, 6/10 pre tx.  Pt states he is scheduled to f/u with [facility] today.    -MARCO ANTONIO       Subjective Pain    Able to rate subjective pain?  yes  -MARCO ANTONIO    Pre-Treatment Pain Level  6  -MARCO ANTONIO    Post-Treatment Pain Level  4  -MARCO ANTONIO      User Key  (r) = Recorded By, (t) = Taken By, (c) = Cosigned By    Initials Name Provider Type    Elizabeth Carmona, ALONDRA Physical Therapy Assistant                      PT Assessment/Plan     Row Name 19 1113          PT Assessment    Assessment Comments  Patient tolerated treatment well today w/ reports of decreased pain following session, 3/10.  Pt had f/u appointment with [facility] and received recommendation to continue w/ therapy services.  Pt initiated today's tx w/ MH and Estim to low back f/b therex as listed.  Additional tband back and postural strengthening activities added w/ good tolerance noted.  Treatment concluded with manual STM to lumbar paraspinals.  Pt continues to progress w/ therapy as tolerated to address goals, and achieve maximum level of function.  No complaints  or adverse reactions observed during and/ or following tx.   -MARCO ANTONIO        PT Plan    PT Plan Comments  Continue with PT's POC and progress tx as tolerated by patient.   -MARCO ANTONIO       User Key  (r) = Recorded By, (t) = Taken By, (c) = Cosigned By    Initials Name Provider Type    Elizabeth Carmona PTA Physical Therapy Assistant          Modalities     Row Name 04/05/19 0700             Subjective Comments    Subjective Comments  Patient states he had f/u appointment with [facility] on Wednesday, and received recommendation to continue w/ therapy services.  Pt reports they released him back to full duty.  Pt reports 5-6/10 lowback and bilateral hip pain prior to tx.   -MARCO ANTONIO         Moist Heat    MH Applied  Yes no redness observed following MH  -MARCO ANTONIO      Location  Lumbar  -MARCO ANTONIO      Rx Minutes  15 mins  -MARCO ANTONIO      MH Prior to Rx  Yes w/ estim in seated position  -MARCO ANTONIO         ELECTRICAL STIMULATION    Attended/Unattended  Unattended no skin irriation observed following estim  -MARCO ANTONIO      Stimulation Type  IFC  -MARCO ANTONIO      Max mAmp  -- as to pt's tolerance  -MARCO ANTONIO      Location/Electrode Placement/Other  Lumbar  -MARCO ANTONIO       PT E-Stim Unattended (Manual) Minutes  15  -MARCO ANTONIO        User Key  (r) = Recorded By, (t) = Taken By, (c) = Cosigned By    Initials Name Provider Type    Elizabeth Carmona PTA Physical Therapy Assistant        Exercises     Row Name 04/05/19 1100 04/05/19 0700          Subjective Comments    Subjective Comments  --  Patient states he had f/u appointment with [facility] on Wednesday, and received recommendation to continue w/ therapy services.  Pt reports they released him back to full duty.  Pt reports 5-6/10 lowback and bilateral hip pain prior to tx.   -MARCO ANTONIO        Subjective Pain    Able to rate subjective pain?  --  yes  -MARCO ANTONIO     Pre-Treatment Pain Level  --  6 5-6/10  -MARCO ANTONIO     Post-Treatment Pain Level  --  3  -MARCO ANTONIO        Total Minutes    42465 - PT Therapeutic Exercise Minutes  --  35  -MARCO ANTONIO     33813  "- PT Manual Therapy Minutes  10  -MARCO ANTONIO  --        Exercise 1    Exercise Name 1  --  LTR 15x2, SKTC 3x20\", piriformis stretch 3x20\", PPT 15x2, bridge 10x2, LE bike LV 2 x 5min, clams (sidelying) 10x2, midrow w/ tband (green) 10x2, lowrow w/ tband (green) 10x2, TG squats 10x3, step ups 6\" 10x2, St. hip ext x10  -MARCO ANTONIO     Cueing 1  --  Verbal;Tactile;Demo  -MARCO ANTONIO     Time 1  --  35 min  -MARCO ANTONIO       User Key  (r) = Recorded By, (t) = Taken By, (c) = Cosigned By    Initials Name Provider Type    Elizabeth Carmona PTA Physical Therapy Assistant                      Manual Rx (last 36 hours)      Manual Treatments     Row Name 04/05/19 1100             Total Minutes    03916 - PT Manual Therapy Minutes  10  -MARCO ANTONIO         Manual Rx 1    Manual Rx 1 Location  lumbar paraspinals  -MARCO ANTONIO      Manual Rx 1 Type  STM  -MARCO ANTONIO      Manual Rx 1 Grade  as to pt's tolerance  -MARCO ANTONIO      Manual Rx 1 Duration  10 min  -MARCO ANTONIO        User Key  (r) = Recorded By, (t) = Taken By, (c) = Cosigned By    Initials Name Provider Type    Elizabeth Carmona PTA Physical Therapy Assistant              Therapy Education  Given: HEP, Symptoms/condition management, Pain management, Posture/body mechanics  Program: Reinforced  How Provided: Verbal, Demonstration  Provided to: Patient  Level of Understanding: Verbalized, Demonstrated              Time Calculation:   Start Time: 0750  Stop Time: 0855  Time Calculation (min): 65 min  Therapy Charges for Today     Code Description Service Date Service Provider Modifiers Qty    56885921227 HC PT THER PROC EA 15 MIN 4/5/2019 Elizabeth Matthew PTA GP 2    46448924364 HC PT MANUAL THERAPY EA 15 MIN 4/5/2019 Elizabeth Matthew, ALONDRA GP 1    68683075451 HC PT ELECTRICAL STIM UNATTENDED 4/5/2019 Elizabeth Matthew PTA  1    27751534817 HC PT HOT/COLD PACK WC NONMCARE 4/5/2019 Elizabeth Matthew PTA GP 1                    Elizabeth Perez. ALONDRA Matthew  4/5/2019     "

## 2019-05-22 ENCOUNTER — DOCUMENTATION (OUTPATIENT)
Dept: PHYSICAL THERAPY | Facility: HOSPITAL | Age: 24
End: 2019-05-22

## 2019-05-22 DIAGNOSIS — M25.562 ACUTE PAIN OF LEFT KNEE: ICD-10-CM

## 2019-05-22 DIAGNOSIS — S39.012D STRAIN OF LUMBAR REGION, SUBSEQUENT ENCOUNTER: Primary | ICD-10-CM

## 2019-05-22 DIAGNOSIS — M25.552 BILATERAL HIP PAIN: ICD-10-CM

## 2019-05-22 DIAGNOSIS — M25.551 BILATERAL HIP PAIN: ICD-10-CM

## 2019-05-22 NOTE — THERAPY DISCHARGE NOTE
Outpatient Physical Therapy Discharge Summary         Patient Name: Gabi Hinds  : 1995  MRN: [id number]    Today's Date: 2019    Visit Dx:    ICD-10-CM ICD-9-CM   1. Strain of lumbar region, subsequent encounter S39.012D V58.89     847.2   2. Acute pain of left knee M25.562 719.46   3. Bilateral hip pain M25.551 719.45    M25.552        PT OP Goals     Row Name 19 1300          PT Short Term Goals    STG Date to Achieve  19  -BE     STG 1  Patient will be independent/compliant with HEP.  -BE     STG 1 Progress Comments  unable to assess  -BE     STG 2  Patient will report pain no greater than 6/10 when performing self-care activities.  -BE     STG 2 Progress Comments  unable to assess  -BE     STG 3  Patient will report pain no greater than 6/10 when sitting to travel community distances.  -BE     STG 3 Progress Comments  unable to assess  -BE     STG 4  Left knee ROM will improve to at least 0-120 to allow for improved gait pattern.  -BE     STG 4 Progress Comments  unable to assess  -BE        Long Term Goals    LTG Date to Achieve  19  -BE     LTG 1  Lumbar ROM will improve to 100% to allow for greater ease with ADLs.  -BE     LTG 1 Progress Comments  unable to assess  -BE     LTG 2  LE strength will improve to at least 4+/5 to prevent reinjury.  -BE     LTG 2 Progress Comments  unable to assess  -BE     LTG 3  Hip ROM will improve by at least 10 degrees to allow for greater ease with ADLs.  -BE     LTG 3 Progress Comments  unable to assess  -BE     LTG 4  Patient will report pain no greater than 2/10 when performing work activities.  -BE     LTG 4 Progress Comments  unable to assess  -BE     LTG 5  Modified Oswestry will improve by at least 20% to show improved functional mobility.  -BE     LTG 5 Progress Comments  unable to assess  -BE       User Key  (r) = Recorded By, (t) = Taken By, (c) = Cosigned By    Initials Name Provider Type    BE Saadia Monae, PT  Physical Therapist          OP PT Discharge Summary  Date of Discharge: 05/22/19  Reason for Discharge: other (comment)(failure to return to therapy)  Outcomes Achieved: Refer to plan of care for updates on goals achieved  Discharge Destination: Unknown  Discharge Instructions/Additional Comments: Patient to be discharged at this time, due to failure to return to therapy.  Patient attended therapy for a total of 6 sessions, dating from 3/21/2019 to 4/05/2019.  Patient was scheduled to attend therapy on 4/8/2019, 4/10/2019, and 4/12/2019; however, he failed to attend those sessions.  Attempts were made to contact patient, but he could not be reached.  Current status is unknown since patient did not return to therapy and could not be reached via telephone.        Time Calculation:                    Saadia Monae, PT  5/22/2019